# Patient Record
Sex: MALE | Race: WHITE | Employment: FULL TIME | URBAN - METROPOLITAN AREA
[De-identification: names, ages, dates, MRNs, and addresses within clinical notes are randomized per-mention and may not be internally consistent; named-entity substitution may affect disease eponyms.]

---

## 2017-04-25 ENCOUNTER — HOSPITAL ENCOUNTER (EMERGENCY)
Facility: CLINIC | Age: 52
Discharge: HOME | End: 2017-04-25
Attending: EMERGENCY MEDICINE | Admitting: EMERGENCY MEDICINE
Payer: COMMERCIAL

## 2017-04-25 VITALS
BODY MASS INDEX: 22.73 KG/M2 | SYSTOLIC BLOOD PRESSURE: 126 MMHG | DIASTOLIC BLOOD PRESSURE: 92 MMHG | HEART RATE: 84 BPM | OXYGEN SATURATION: 98 % | TEMPERATURE: 97.8 F | RESPIRATION RATE: 18 BRPM | HEIGHT: 68 IN | WEIGHT: 150 LBS

## 2017-04-25 DIAGNOSIS — N41.9 PROSTATITIS, UNSPECIFIED PROSTATITIS TYPE: ICD-10-CM

## 2017-04-25 LAB
ALBUMIN UR-MCNC: NEGATIVE MG/DL
ANION GAP SERPL CALCULATED.3IONS-SCNC: 14 MMOL/L (ref 3–14)
APPEARANCE UR: CLEAR
BASOPHILS # BLD AUTO: 0 10E9/L (ref 0–0.2)
BASOPHILS NFR BLD AUTO: 0.5 %
BILIRUB UR QL STRIP: NEGATIVE
BUN SERPL-MCNC: 12 MG/DL (ref 7–30)
CALCIUM SERPL-MCNC: 9.2 MG/DL (ref 8.5–10.1)
CHLORIDE SERPL-SCNC: 104 MMOL/L (ref 94–109)
CO2 SERPL-SCNC: 21 MMOL/L (ref 20–32)
COLOR UR AUTO: YELLOW
CREAT SERPL-MCNC: 0.89 MG/DL (ref 0.66–1.25)
DIFFERENTIAL METHOD BLD: ABNORMAL
EOSINOPHIL # BLD AUTO: 0.1 10E9/L (ref 0–0.7)
EOSINOPHIL NFR BLD AUTO: 1.1 %
ERYTHROCYTE [DISTWIDTH] IN BLOOD BY AUTOMATED COUNT: 13.3 % (ref 10–15)
GFR SERPL CREATININE-BSD FRML MDRD: 89 ML/MIN/1.7M2
GLUCOSE SERPL-MCNC: 112 MG/DL (ref 70–99)
GLUCOSE UR STRIP-MCNC: NEGATIVE MG/DL
HCT VFR BLD AUTO: 39.9 % (ref 40–53)
HGB BLD-MCNC: 13.7 G/DL (ref 13.3–17.7)
HGB UR QL STRIP: NEGATIVE
IMM GRANULOCYTES # BLD: 0 10E9/L (ref 0–0.4)
IMM GRANULOCYTES NFR BLD: 0.6 %
KETONES UR STRIP-MCNC: 10 MG/DL
LEUKOCYTE ESTERASE UR QL STRIP: NEGATIVE
LYMPHOCYTES # BLD AUTO: 1.5 10E9/L (ref 0.8–5.3)
LYMPHOCYTES NFR BLD AUTO: 23.2 %
MCH RBC QN AUTO: 32.6 PG (ref 26.5–33)
MCHC RBC AUTO-ENTMCNC: 34.3 G/DL (ref 31.5–36.5)
MCV RBC AUTO: 95 FL (ref 78–100)
MONOCYTES # BLD AUTO: 0.7 10E9/L (ref 0–1.3)
MONOCYTES NFR BLD AUTO: 11.6 %
NEUTROPHILS # BLD AUTO: 4 10E9/L (ref 1.6–8.3)
NEUTROPHILS NFR BLD AUTO: 63 %
NITRATE UR QL: NEGATIVE
NRBC # BLD AUTO: 0 10*3/UL
NRBC BLD AUTO-RTO: 0 /100
PH UR STRIP: 6 PH (ref 5–7)
PLATELET # BLD AUTO: 272 10E9/L (ref 150–450)
POTASSIUM SERPL-SCNC: 3.9 MMOL/L (ref 3.4–5.3)
RBC # BLD AUTO: 4.2 10E12/L (ref 4.4–5.9)
RBC #/AREA URNS AUTO: 3 /HPF (ref 0–2)
SODIUM SERPL-SCNC: 139 MMOL/L (ref 133–144)
SP GR UR STRIP: 1.01 (ref 1–1.03)
URN SPEC COLLECT METH UR: ABNORMAL
UROBILINOGEN UR STRIP-MCNC: NORMAL MG/DL (ref 0–2)
WBC # BLD AUTO: 6.4 10E9/L (ref 4–11)
WBC #/AREA URNS AUTO: 1 /HPF (ref 0–2)

## 2017-04-25 PROCEDURE — 80048 BASIC METABOLIC PNL TOTAL CA: CPT | Performed by: EMERGENCY MEDICINE

## 2017-04-25 PROCEDURE — 99284 EMERGENCY DEPT VISIT MOD MDM: CPT | Mod: 25 | Performed by: EMERGENCY MEDICINE

## 2017-04-25 PROCEDURE — 96360 HYDRATION IV INFUSION INIT: CPT | Performed by: EMERGENCY MEDICINE

## 2017-04-25 PROCEDURE — 81001 URINALYSIS AUTO W/SCOPE: CPT | Performed by: EMERGENCY MEDICINE

## 2017-04-25 PROCEDURE — 85025 COMPLETE CBC W/AUTO DIFF WBC: CPT | Performed by: EMERGENCY MEDICINE

## 2017-04-25 PROCEDURE — 99284 EMERGENCY DEPT VISIT MOD MDM: CPT | Mod: Z6 | Performed by: EMERGENCY MEDICINE

## 2017-04-25 PROCEDURE — 25000128 H RX IP 250 OP 636: Performed by: EMERGENCY MEDICINE

## 2017-04-25 RX ORDER — LEVOFLOXACIN 500 MG/1
500 TABLET, FILM COATED ORAL DAILY
Qty: 14 TABLET | Refills: 0 | Status: SHIPPED | OUTPATIENT
Start: 2017-04-25 | End: 2017-05-09

## 2017-04-25 RX ADMIN — SODIUM CHLORIDE 500 ML: 0.9 INJECTION, SOLUTION INTRAVENOUS at 11:17

## 2017-04-25 ASSESSMENT — ENCOUNTER SYMPTOMS
SHORTNESS OF BREATH: 0
DIFFICULTY URINATING: 1
DIAPHORESIS: 1
NAUSEA: 1
SORE THROAT: 0
RHINORRHEA: 1
HEMATURIA: 0
COUGH: 0
WHEEZING: 0
DYSURIA: 1
VOMITING: 0
BLOOD IN STOOL: 0
ABDOMINAL PAIN: 1
FEVER: 0
FREQUENCY: 1

## 2017-04-25 NOTE — ED NOTES
Patient currently being worked up for prostate cancer, was put on doxycycline for inflammation and possible infection, patient was to take the medication then have blood work done again. Since starting the medication last Tuesday patient has become for fatigued, weak, and nauseated.

## 2017-04-25 NOTE — ED AVS SNAPSHOT
[facility], Emergency Department    [address] [address]    Phone:  [phone]                                       Jaskaran Pastor   MRN: [id number]    Department:  [facility], Emergency Department   Date of Visit:  4/25/2017           Patient Information     Date Of Birth          1965        Your diagnoses for this visit were:     Prostatitis, unspecified prostatitis type        You were seen by Demetria Bautista MD.        Discharge Instructions       You have been seen in the ER for prostate symptoms.  Since you seem to have some issues with the doxycyline antibiotic, we can change it to levaquin.  Take this daily for 2 weeks.  Continue taking the flomax medicine every single day. Stop the doxycyline.    You should see your urology clinic in 2 weeks for a recheck and to determine if you need any further courses of antibiotics (treating prostate infections requires a prolonged course of antibiotics).      If you feel that you have problems with complete inability to empty the bladder, come back to the ER so we can evaluate you for that.     Discharge References/Attachments     URINARY SYMPTOMS, PROSTATE PROBLEMS AND RELATED (ENGLISH)    PROSTATITIS (ENGLISH)      24 Hour Appointment Hotline       To make an appointment at any [facility], call [phone] ([phone]). If you don't have a family doctor or clinic, we will help you find one. [facility] clinics are conveniently located to serve the needs of you and your family.             Review of your medicines      START taking        Dose / Directions Last dose taken    levofloxacin 500 MG tablet   Commonly known as:  LEVAQUIN   Dose:  500 mg   Quantity:  14 tablet        Take 1 tablet (500 mg) by mouth daily for 14 days   Refills:  0          Our records show that you are taking the medicines listed below. If these are incorrect, please call your family doctor or clinic.        Dose / Directions Last dose taken  "   ASPIRIN ADULT LOW STRENGTH PO        Take  by mouth.   Refills:  0        Multi-vitamin Tabs tablet   Dose:  1 tablet        Take 1 tablet by mouth daily.   Refills:  0                Prescriptions were sent or printed at these locations (1 Prescription)                   Other Prescriptions                Printed at Department/Unit printer (1 of 1)         levofloxacin (LEVAQUIN) 500 MG tablet                Procedures and tests performed during your visit     Basic metabolic panel    Bladder scan    CBC with platelets differential    UA with Microscopic reflex to Culture      Orders Needing Specimen Collection     None      Pending Results     No orders found from 2017 to 2017.            Pending Culture Results     No orders found from 2017 to 2017.            Thank you for choosing [facility]       Thank you for choosing [facility] for your care. Our goal is always to provide you with excellent care. Hearing back from our patients is one way we can continue to improve our services. Please take a few minutes to complete the written survey that you may receive in the mail after you visit with us. Thank you!        [employer] Information     [employer] lets you send messages to your doctor, view your test results, renew your prescriptions, schedule appointments and more. To sign up, go to [email or web address] . Click on \"Log in\" on the left side of the screen, which will take you to the Welcome page. Then click on \"Sign up Now\" on the right side of the page.     You will be asked to enter the access code listed below, as well as some personal information. Please follow the directions to create your username and password.     Your access code is: [email or web address]  Expires: 2017 12:41 PM     Your access code will  in 90 days. If you need help or a new code, please call your [facility] clinic or [phone].        Care EveryWhere ID     This is your Care EveryWhere ID. This could be used by " other organizations to access your [facility] medical records  [id number]        After Visit Summary       This is your record. Keep this with you and show to your community pharmacist(s) and doctor(s) at your next visit.

## 2017-04-25 NOTE — DISCHARGE INSTRUCTIONS
You have been seen in the ER for prostate symptoms.  Since you seem to have some issues with the doxycyline antibiotic, we can change it to levaquin.  Take this daily for 2 weeks.  Continue taking the flomax medicine every single day. Stop the doxycyline.    You should see your urology clinic in 2 weeks for a recheck and to determine if you need any further courses of antibiotics (treating prostate infections requires a prolonged course of antibiotics).      If you feel that you have problems with complete inability to empty the bladder, come back to the ER so we can evaluate you for that.

## 2017-04-25 NOTE — ED PROVIDER NOTES
"  History     Chief Complaint   Patient presents with     Nausea     Fatigue     HPI  Jaskaran Pastor is a 51 year old male with a history of kidney donor who presents to the Emergency Department with a friend for evaluation of nausea. The patient reports for the past year he has been experiencing difficulty with straining, urinary frequency, dysuria and a sensation that his bladder is not emptying. He also reports suprapubic abdominal pain for the past couple weeks. He was seen in clinic at [facility] for a routine follow up and was found to have a PSA of 4.8, diagnosed with prostatitis, and instructed to follow up with urology. He was seen by urology on 4/18 (1 week ago) where he was placed on Flomax and Doxycycline. He has not noticed any improvement in symptoms since starting this medicine and reports nausea with taking the medications and inability to breath through his nose. This morning after taking the medication he developed severe nausea with diaphoresis, rhinorrhea and \"had to lay down\" prompting him to come to the ED.  He denies vomiting, hematuria, fever, bloody stools, upper abdominal pain, cough, cold symptoms, chest pain, wheezing or any other concerns or complaints at this time. He feels fine now except for mild nausea.    Past Medical History:   Diagnosis Date     Chronic pain     left hand       Past Surgical History:   Procedure Laterality Date     ARTHROSCOPY SHOULDER DECOMPRESSION  4/16/2013    Procedure: ARTHROSCOPY SHOULDER DECOMPRESSION;  Left Shoulder Arthroscopic decompression distal clavicle excision, and cyst excision    ;  Surgeon: Ousmane Parra MD;  Location: [facility] OR     BACK SURGERY      bone chips rem lower spine     DENERVATION OF SPERMATIC CORD MICROSURGICAL  1/7/2014    Procedure: DENERVATION OF SPERMATIC CORD MICROSURGICAL;  Microscopic Denervation of Left Spermatic Cord, Left Inguinal Hydrocelectomy;  Surgeon: Dawit Killian MD;  Location: [facility] OR     DONOR KIDNEY LIVING " "UNRELATED       EXCISE MASS UPPER EXTREMITY  4/16/2013    Procedure: EXCISE MASS UPPER EXTREMITY;;  Surgeon: Ousmane Parra MD;  Location: [facility] OR     HERNIA REPAIR       HYDROCELECTOMY INGUINAL  1/7/2014    Procedure: HYDROCELECTOMY INGUINAL;;  Surgeon: Dawit Killian MD;  Location: [facility] OR       No family history on file.    Social History   Substance Use Topics     Smoking status: Former Smoker     Smokeless tobacco: Never Used      Comment: quit 10 plus yrs ago     Alcohol use No       No current facility-administered medications for this encounter.      Current Outpatient Prescriptions   Medication     levofloxacin (LEVAQUIN) 500 MG tablet     ASPIRIN ADULT LOW STRENGTH PO     multivitamin, therapeutic with minerals (MULTI-VITAMIN) TABS      No Known Allergies    I have reviewed the Medications, Allergies, Past Medical and Surgical History, and Social History in the Epic system.    Review of Systems   Constitutional: Positive for diaphoresis. Negative for fever.   HENT: Positive for rhinorrhea. Negative for congestion, nosebleeds and sore throat.    Respiratory: Negative for cough, shortness of breath and wheezing.    Cardiovascular: Negative for chest pain.   Gastrointestinal: Positive for abdominal pain (suprapubic) and nausea. Negative for blood in stool and vomiting.   Genitourinary: Positive for difficulty urinating, dysuria and frequency. Negative for hematuria.   All other systems reviewed and are negative.      Physical Exam   BP: (!) 130/97  Pulse: 84  Temp: 97.8  F (36.6  C)  Resp: 18  Height: 172.7 cm (5' 8\")  Weight: 68 kg (150 lb)  SpO2: 99 %  Physical Exam   Constitutional: No distress.   Thin highly anxious adult male, NAD   HENT:   Head: Normocephalic and atraumatic.   Mouth/Throat: Oropharynx is clear and moist. No oropharyngeal exudate.   Eyes: Pupils are equal, round, and reactive to light. No scleral icterus.   Cardiovascular: Normal rate, regular rhythm, normal heart sounds and intact " distal pulses.    No murmur heard.  Pulmonary/Chest: Effort normal and breath sounds normal. No respiratory distress. He has no wheezes. He has no rales.   Abdominal: Soft. Bowel sounds are normal. There is no tenderness.   Musculoskeletal: He exhibits no edema or tenderness.   Skin: Skin is warm. No rash noted. He is not diaphoretic.   Psychiatric:   Highly anxious.  Some facial tics noted   Nursing note and vitals reviewed.      ED Course     10:28 AM  The patient was seen and examined by Dr. Bautista in Room 20.     ED Course     Procedures             Critical Care time:  none               Results for orders placed or performed during the hospital encounter of 04/25/17 (from the past 24 hour(s))   Basic metabolic panel   Result Value Ref Range    Sodium 139 133 - 144 mmol/L    Potassium 3.9 3.4 - 5.3 mmol/L    Chloride 104 94 - 109 mmol/L    Carbon Dioxide 21 20 - 32 mmol/L    Anion Gap 14 3 - 14 mmol/L    Glucose 112 (H) 70 - 99 mg/dL    Urea Nitrogen 12 7 - 30 mg/dL    Creatinine 0.89 0.66 - 1.25 mg/dL    GFR Estimate 89 >60 mL/min/1.7m2    GFR Estimate If Black >90   GFR Calc   >60 mL/min/1.7m2    Calcium 9.2 8.5 - 10.1 mg/dL   CBC with platelets differential   Result Value Ref Range    WBC 6.4 4.0 - 11.0 10e9/L    RBC Count 4.20 (L) 4.4 - 5.9 10e12/L    Hemoglobin 13.7 13.3 - 17.7 g/dL    Hematocrit 39.9 (L) 40.0 - 53.0 %    MCV 95 78 - 100 fl    MCH 32.6 26.5 - 33.0 pg    MCHC 34.3 31.5 - 36.5 g/dL    RDW 13.3 10.0 - 15.0 %    Platelet Count 272 150 - 450 10e9/L    Diff Method Automated Method     % Neutrophils 63.0 %    % Lymphocytes 23.2 %    % Monocytes 11.6 %    % Eosinophils 1.1 %    % Basophils 0.5 %    % Immature Granulocytes 0.6 %    Nucleated RBCs 0 0 /100    Absolute Neutrophil 4.0 1.6 - 8.3 10e9/L    Absolute Lymphocytes 1.5 0.8 - 5.3 10e9/L    Absolute Monocytes 0.7 0.0 - 1.3 10e9/L    Absolute Eosinophils 0.1 0.0 - 0.7 10e9/L    Absolute Basophils 0.0 0.0 - 0.2 10e9/L    Abs  Immature Granulocytes 0.0 0 - 0.4 10e9/L    Absolute Nucleated RBC 0.0    UA with Microscopic reflex to Culture   Result Value Ref Range    Color Urine Yellow     Appearance Urine Clear     Glucose Urine Negative NEG mg/dL    Bilirubin Urine Negative NEG    Ketones Urine 10 (A) NEG mg/dL    Specific Gravity Urine 1.012 1.003 - 1.035    Blood Urine Negative NEG    pH Urine 6.0 5.0 - 7.0 pH    Protein Albumin Urine Negative NEG mg/dL    Urobilinogen mg/dL Normal 0.0 - 2.0 mg/dL    Nitrite Urine Negative NEG    Leukocyte Esterase Urine Negative NEG    Source Midstream Urine     WBC Urine 1 0 - 2 /HPF    RBC Urine 3 (H) 0 - 2 /HPF         Consults  Urology: Responded    Assessments & Plan (with Medical Decision Making)   This is a 51-year-old male who presents to the Emergency Department today with generally feeling unwell.  It appears that he has had some issues of prostatitis, had seen in outside clinic, through the [facility] system on April 14th.  I see through care everywhere we have a family practice office visit dated April 14th, at that point he had a routine PSA which was mildly elevated at 4.8.  He was started on antibiotics, Doxycycline, for presumed prostatitis. He does report that was fairly asymptomatic from this including having dysuria, incomplete emptying, urinary frequency, and pelvic pain.  Upon further evaluation this appears to be more chronic problem for him.  He actually saw a urologist in clinic through the [facility] system on April 18th, urology started him on doxycycline twice a day ×1 month and Flomax 0.4 mg daily.  Their plan was to reevaluate him.  Since then he s noticed increasing urinary problems and feeling unwell with nausea but no vomiting.  He seems to think that the Doxycycline is causing some significant side effects for her.    On my evaluation in the ED he is alert, cooperative, in no distress.  Vital signs are normal.  He does appear to be highly, highly anxious.  Differential diagnosis  certainly can include urinary tract infection.  Need to consider incompletely treated prostatitis since he just started antibiotics for this about on April 18th.  BPH causing urinary retention is also a possibility.  Quite frankly I am suspecting that he is strongly worried and concerned about the possibility of prostate cancer as they of course did discuss this with him, though until the prostatitis is treated they are not going to be able to do any additional workup.    We did establish IV access and we did a blood for laboratory analysis. CBC is within normal limits with a white count of 6.3, hemoglobin is normal. Basic metabolic panel is normal with a normal creatinine. UA does not have any evidence of infection with negative nitrites and LE. There is 1 white cell and 3 red cells in the urine.    I discussed with the patient the fact that he could be experiencing side effects of the Doxycycline. All antibiotics can have potential side effects including the possibility of nausea and vomiting. Alternatively this could represent failure of the antibiotic treatment, however, he has not had much time to complete the course to truly treat the infection as prostatitis often requires a prolonged course of antibiotics.    The patient seems quiet convinced the doxycycline is causing the issues. I am happy to change this Levaquin. I did explain to him that Levaquin, particularly with prolonged courses, can potentially have side effects of its own including tendinopathy.  I will place him on a 2 week course of antibiotics at this point. He should definitely follow up with his urology clinic in 2 weeks for reassessment. They may choose to continue his antibiotics for an additional period of time. I recommend to continue to take the Flomax. Of note, we did do a post void residual on the patient which was approximately 100 cc. I strongly suspect he has some degree of urinary retention but it is not to the point where he  requires a Patrick catheter. Hopefully with time, treatment of his prostatitis and additional daily use of Flomax, he will have resolution of his urinary symptoms. He has urology follow up through the [facility] system. Patient verbalizes understanding.    I have reviewed the nursing notes.  I have reviewed the findings, diagnosis, plan and need for follow up with the patient.  Discharge Medication List as of 4/25/2017 12:41 PM      START taking these medications    Details   levofloxacin (LEVAQUIN) 500 MG tablet Take 1 tablet (500 mg) by mouth daily for 14 days, Disp-14 tablet, R-0, Local Print             Final diagnoses:   Prostatitis, unspecified prostatitis type     I, Citlali Huggins, am serving as a trained medical scribe to document services personally performed by Demetria Bautista MD, based on the provider's statements to me.      I, Demetria Bautista MD, was physically present and have reviewed and verified the accuracy of this note documented by Citlali Huggins.     4/25/2017   [facility], [place], EMERGENCY DEPARTMENT     Demetria Bautista MD  04/25/17 1600